# Patient Record
Sex: MALE | Race: WHITE
[De-identification: names, ages, dates, MRNs, and addresses within clinical notes are randomized per-mention and may not be internally consistent; named-entity substitution may affect disease eponyms.]

---

## 2019-12-06 ENCOUNTER — HOSPITAL ENCOUNTER (OUTPATIENT)
Dept: HOSPITAL 83 - RAD | Age: 64
Discharge: HOME | End: 2019-12-06
Attending: FAMILY MEDICINE
Payer: COMMERCIAL

## 2019-12-06 DIAGNOSIS — M51.36: Primary | ICD-10-CM

## 2019-12-06 DIAGNOSIS — M16.11: ICD-10-CM

## 2020-12-01 ENCOUNTER — HOSPITAL ENCOUNTER (OUTPATIENT)
Dept: HOSPITAL 83 - RAD | Age: 65
Discharge: HOME | End: 2020-12-01
Attending: FAMILY MEDICINE
Payer: COMMERCIAL

## 2020-12-01 DIAGNOSIS — M25.472: Primary | ICD-10-CM

## 2022-04-10 ENCOUNTER — HOSPITAL ENCOUNTER (EMERGENCY)
Dept: HOSPITAL 83 - ED | Age: 67
Discharge: HOME | End: 2022-04-10
Payer: COMMERCIAL

## 2022-04-10 VITALS — DIASTOLIC BLOOD PRESSURE: 65 MMHG

## 2022-04-10 VITALS — HEIGHT: 60 IN | WEIGHT: 212 LBS

## 2022-04-10 DIAGNOSIS — M25.431: Primary | ICD-10-CM

## 2022-10-25 ENCOUNTER — HOSPITAL ENCOUNTER (OUTPATIENT)
Dept: HOSPITAL 83 - LAB | Age: 67
Discharge: HOME | End: 2022-10-25
Attending: FAMILY MEDICINE
Payer: COMMERCIAL

## 2022-10-25 DIAGNOSIS — R79.89: ICD-10-CM

## 2022-10-25 DIAGNOSIS — R53.83: ICD-10-CM

## 2022-10-25 DIAGNOSIS — R74.8: ICD-10-CM

## 2022-10-25 DIAGNOSIS — E78.5: Primary | ICD-10-CM

## 2022-10-25 DIAGNOSIS — E55.9: ICD-10-CM

## 2022-10-25 DIAGNOSIS — Z12.5: ICD-10-CM

## 2022-10-25 LAB
25(OH)D3 SERPL-MCNC: 70.1 NG/ML (ref 30–100)
ALP SERPL-CCNC: 54 U/L (ref 45–117)
ALT SERPL W P-5'-P-CCNC: 38 U/L (ref 12–78)
AST SERPL-CCNC: 27 IU/L (ref 3–35)
BACTERIA #/AREA URNS HPF: (no result) /[HPF]
BASOPHILS # BLD AUTO: 0.1 10*3/UL (ref 0–0.1)
BASOPHILS NFR BLD AUTO: 1.2 % (ref 0–1)
BUN SERPL-MCNC: 5 MG/DL (ref 7–24)
CHLORIDE SERPL-SCNC: 105 MMOL/L (ref 98–107)
CHOLEST SERPL-MCNC: 209 MG/DL (ref ?–200)
CREAT SERPL-MCNC: 0.77 MG/DL (ref 0.7–1.3)
EOSINOPHIL # BLD AUTO: 0.1 10*3/UL (ref 0–0.4)
EOSINOPHIL # BLD AUTO: 1.2 % (ref 1–4)
EPI CELLS #/AREA URNS HPF: (no result) /[HPF]
ERYTHROCYTE [DISTWIDTH] IN BLOOD BY AUTOMATED COUNT: 12.4 % (ref 0–14.5)
FERRITIN SERPL-MCNC: 582.1 NG/ML (ref 22–322)
GGT SERPL-CCNC: 160 U/L (ref 15–85)
HCT VFR BLD AUTO: 43.1 % (ref 42–52)
IRON SERPL-MCNC: 142 UG/DL (ref 65–175)
LDLC SERPL DIRECT ASSAY-MCNC: 114 MG/DL (ref 9–159)
LYMPHOCYTES # BLD AUTO: 2.1 10*3/UL (ref 1.3–4.4)
LYMPHOCYTES NFR BLD AUTO: 40.7 % (ref 27–41)
MCH RBC QN AUTO: 34.7 PG (ref 27–31)
MCHC RBC AUTO-ENTMCNC: 36.4 G/DL (ref 33–37)
MCV RBC AUTO: 95.1 FL (ref 80–94)
MONOCYTES # BLD AUTO: 0.6 10*3/UL (ref 0.1–1)
MONOCYTES NFR BLD MANUAL: 10.9 % (ref 3–9)
NEUT #: 2.3 10*3/UL (ref 2.3–7.9)
NEUT %: 45.6 % (ref 47–73)
NRBC BLD QL AUTO: 0 % (ref 0–0)
PH UR STRIP: 7.5 [PH] (ref 4.5–8)
PLATELET # BLD AUTO: 252 10*3/UL (ref 130–400)
PMV BLD AUTO: 9.2 FL (ref 9.6–12.3)
POTASSIUM SERPL-SCNC: 4.4 MMOL/L (ref 3.5–5.1)
PROT SERPL-MCNC: 7.5 GM/DL (ref 6.4–8.2)
RBC # BLD AUTO: 4.53 10*6/UL (ref 4.5–5.9)
RETICS/RBC NFR AUTO: 2.24 % (ref 0.5–2.5)
SODIUM SERPL-SCNC: 137 MMOL/L (ref 136–145)
SP GR UR: <= 1.005 (ref 1–1.03)
TRIGL SERPL-MCNC: 143 MG/DL (ref ?–150)
TSH SERPL DL<=0.005 MIU/L-ACNC: 1.07 UIU/ML (ref 0.36–4.75)
UROBILINOGEN UR STRIP-MCNC: 1 E.U./DL (ref 0–1)
VITAMIN B12: 286 PG/ML (ref 247–911)
WBC #/AREA URNS HPF: (no result) WBC/HPF (ref 0–5)
WBC NRBC COR # BLD AUTO: 5.1 10*3/UL (ref 4.8–10.8)

## 2023-07-28 ENCOUNTER — HOSPITAL ENCOUNTER (OUTPATIENT)
Dept: HOSPITAL 83 - ORTHO | Age: 68
Discharge: HOME | End: 2023-07-28
Attending: ORTHOPAEDIC SURGERY
Payer: COMMERCIAL

## 2023-07-28 DIAGNOSIS — M16.0: Primary | ICD-10-CM

## 2023-09-11 ENCOUNTER — HOSPITAL ENCOUNTER (OUTPATIENT)
Dept: HOSPITAL 83 - ORTHO | Age: 68
Discharge: HOME | End: 2023-09-11
Attending: ORTHOPAEDIC SURGERY
Payer: COMMERCIAL

## 2023-09-11 DIAGNOSIS — Z96.641: ICD-10-CM

## 2023-09-11 DIAGNOSIS — Z47.1: Primary | ICD-10-CM

## 2023-10-09 ENCOUNTER — HOSPITAL ENCOUNTER (OUTPATIENT)
Dept: HOSPITAL 83 - ORTHO | Age: 68
Discharge: HOME | End: 2023-10-09
Attending: ORTHOPAEDIC SURGERY
Payer: COMMERCIAL

## 2023-10-09 DIAGNOSIS — Z47.1: Primary | ICD-10-CM

## 2023-10-11 ENCOUNTER — HOSPITAL ENCOUNTER (OUTPATIENT)
Dept: HOSPITAL 83 - LAB | Age: 68
Discharge: HOME | End: 2023-10-11
Attending: FAMILY MEDICINE
Payer: COMMERCIAL

## 2023-10-11 DIAGNOSIS — E55.9: ICD-10-CM

## 2023-10-11 DIAGNOSIS — E78.5: ICD-10-CM

## 2023-10-11 DIAGNOSIS — Z12.5: ICD-10-CM

## 2023-10-11 DIAGNOSIS — R79.89: ICD-10-CM

## 2023-10-11 DIAGNOSIS — J44.9: Primary | ICD-10-CM

## 2023-10-11 DIAGNOSIS — R53.83: ICD-10-CM

## 2023-10-11 LAB
25(OH)D3 SERPL-MCNC: 102.7 NG/ML (ref 30–100)
ALP SERPL-CCNC: 95 U/L (ref 46–116)
ALT SERPL W P-5'-P-CCNC: 10 U/L (ref 10–49)
BACTERIA #/AREA URNS HPF: (no result) /[HPF]
BASOPHILS # BLD AUTO: 0.1 10*3/UL (ref 0–0.1)
BASOPHILS NFR BLD AUTO: 0.6 % (ref 0–1)
BILIRUB UR QL STRIP: (no result)
BUN SERPL-MCNC: 6 MG/DL (ref 9–23)
CHLORIDE SERPL-SCNC: 104 MMOL/L (ref 98–107)
CHOLEST SERPL-MCNC: 188 MG/DL (ref ?–200)
EOSINOPHIL # BLD AUTO: 0.1 10*3/UL (ref 0–0.4)
EOSINOPHIL # BLD AUTO: 0.6 % (ref 1–4)
EPI CELLS #/AREA URNS HPF: (no result) /[HPF]
ERYTHROCYTE [DISTWIDTH] IN BLOOD BY AUTOMATED COUNT: 13.5 % (ref 0–14.5)
GGT SERPL-CCNC: 42 U/L (ref 0–73)
HCT VFR BLD AUTO: 48.1 % (ref 42–52)
LDLC SERPL DIRECT ASSAY-MCNC: 99 MG/DL (ref 9–159)
LEUKOCYTE ESTERASE UR QL STRIP: (no result)
LYMPHOCYTES # BLD AUTO: 1.8 10*3/UL (ref 1.3–4.4)
LYMPHOCYTES NFR BLD AUTO: 16.6 % (ref 27–41)
MCH RBC QN AUTO: 31.8 PG (ref 27–31)
MCHC RBC AUTO-ENTMCNC: 33.3 G/DL (ref 33–37)
MCV RBC AUTO: 95.6 FL (ref 80–94)
MONOCYTES # BLD AUTO: 0.8 10*3/UL (ref 0.1–1)
MONOCYTES NFR BLD MANUAL: 7.7 % (ref 3–9)
NEUT #: 7.9 10*3/UL (ref 2.3–7.9)
NEUT %: 74.3 % (ref 47–73)
NRBC BLD QL AUTO: 0 10*3/UL (ref 0–0)
PH UR STRIP: 6 [PH] (ref 4.5–8)
PLATELET # BLD AUTO: 256 10*3/UL (ref 130–400)
PMV BLD AUTO: 9.3 FL (ref 9.6–12.3)
POTASSIUM SERPL-SCNC: 3.8 MMOL/L (ref 3.4–5.1)
PROT SERPL-MCNC: 8.1 GM/DL (ref 6–8)
RBC # BLD AUTO: 5.03 10*6/UL (ref 4.5–5.9)
RBC #/AREA URNS HPF: (no result) RBC/HPF (ref 0–2)
RETICS/RBC NFR AUTO: 2.59 % (ref 0.5–2.5)
SP GR UR: 1.02 (ref 1–1.03)
T3 SERPL-MCNC: 22.6 % (ref 22.4–36.7)
T4 SERPL-MCNC: 8.1 UG/DL (ref 4.5–10.9)
TRIGL SERPL-MCNC: 133 MG/DL (ref ?–150)
URATE SERPL-MCNC: 7.6 MG/DL (ref 3.7–9.2)
UROBILINOGEN UR STRIP-MCNC: 1 E.U./DL (ref 0–1)
WBC #/AREA URNS HPF: (no result) WBC/HPF (ref 0–5)
WBC NRBC COR # BLD AUTO: 10.7 10*3/UL (ref 4.8–10.8)

## 2023-10-12 LAB — DSDNA AB SER-ACNC: <1 IU/ML (ref 0–9)

## 2023-10-13 ENCOUNTER — HOSPITAL ENCOUNTER (INPATIENT)
Age: 68
LOS: 2 days | Discharge: HOME | DRG: 299 | End: 2023-10-15
Attending: STUDENT IN AN ORGANIZED HEALTH CARE EDUCATION/TRAINING PROGRAM | Admitting: STUDENT IN AN ORGANIZED HEALTH CARE EDUCATION/TRAINING PROGRAM
Payer: MEDICARE

## 2023-10-13 ENCOUNTER — HOSPITAL ENCOUNTER (OUTPATIENT)
Dept: HOSPITAL 83 - CT | Age: 68
Discharge: HOME | End: 2023-10-13
Attending: FAMILY MEDICINE
Payer: COMMERCIAL

## 2023-10-13 ENCOUNTER — HOSPITAL ENCOUNTER (EMERGENCY)
Dept: HOSPITAL 83 - ED | Age: 68
Discharge: ANOTHER HOSPITAL | End: 2023-10-13
Payer: COMMERCIAL

## 2023-10-13 VITALS — WEIGHT: 205 LBS | HEIGHT: 73.98 IN | BODY MASS INDEX: 26.31 KG/M2

## 2023-10-13 VITALS — DIASTOLIC BLOOD PRESSURE: 92 MMHG | SYSTOLIC BLOOD PRESSURE: 153 MMHG

## 2023-10-13 DIAGNOSIS — R06.00: ICD-10-CM

## 2023-10-13 DIAGNOSIS — I26.99: ICD-10-CM

## 2023-10-13 DIAGNOSIS — Z98.890: ICD-10-CM

## 2023-10-13 DIAGNOSIS — R06.09: ICD-10-CM

## 2023-10-13 DIAGNOSIS — I51.7: ICD-10-CM

## 2023-10-13 DIAGNOSIS — I25.10: ICD-10-CM

## 2023-10-13 DIAGNOSIS — R09.02: ICD-10-CM

## 2023-10-13 DIAGNOSIS — E78.00: ICD-10-CM

## 2023-10-13 DIAGNOSIS — F17.220: ICD-10-CM

## 2023-10-13 DIAGNOSIS — K21.9: ICD-10-CM

## 2023-10-13 DIAGNOSIS — Z48.89: Primary | ICD-10-CM

## 2023-10-13 DIAGNOSIS — I26.99: Primary | ICD-10-CM

## 2023-10-13 DIAGNOSIS — Z96.653: ICD-10-CM

## 2023-10-13 PROBLEM — I26.09 ACUTE PULMONARY EMBOLISM WITH ACUTE COR PULMONALE, UNSPECIFIED PULMONARY EMBOLISM TYPE (HCC): Status: ACTIVE | Noted: 2023-10-13

## 2023-10-13 LAB
ALP SERPL-CCNC: 79 U/L (ref 46–116)
ALT SERPL W P-5'-P-CCNC: < 7 U/L (ref 10–49)
BASOPHILS # BLD AUTO: 0.1 10*3/UL (ref 0–0.1)
BASOPHILS NFR BLD AUTO: 0.8 % (ref 0–1)
BUN SERPL-MCNC: 7 MG/DL (ref 9–23)
CHLORIDE SERPL-SCNC: 107 MMOL/L (ref 98–107)
EOSINOPHIL # BLD AUTO: 0.1 10*3/UL (ref 0–0.4)
EOSINOPHIL # BLD AUTO: 0.9 % (ref 1–4)
ERYTHROCYTE [DISTWIDTH] IN BLOOD BY AUTOMATED COUNT: 13.5 % (ref 0–14.5)
ERYTHROCYTE [DISTWIDTH] IN BLOOD BY AUTOMATED COUNT: 13.7 % (ref 11.5–15)
HCT VFR BLD AUTO: 42.4 % (ref 37–54)
HCT VFR BLD AUTO: 44.2 % (ref 42–52)
HGB BLD-MCNC: 14.1 G/DL (ref 12.5–16.5)
INR BLD: 1.1 (ref 2–3.5)
LYMPHOCYTES # BLD AUTO: 1.8 10*3/UL (ref 1.3–4.4)
LYMPHOCYTES NFR BLD AUTO: 23.6 % (ref 27–41)
MCH RBC QN AUTO: 32 PG (ref 27–31)
MCH RBC QN AUTO: 32.3 PG (ref 26–35)
MCHC RBC AUTO-ENTMCNC: 33.3 G/DL (ref 32–34.5)
MCHC RBC AUTO-ENTMCNC: 33.3 G/DL (ref 33–37)
MCV RBC AUTO: 96.1 FL (ref 80–94)
MCV RBC AUTO: 97 FL (ref 80–99.9)
MONOCYTES # BLD AUTO: 0.7 10*3/UL (ref 0.1–1)
MONOCYTES NFR BLD MANUAL: 8.7 % (ref 3–9)
NEUT #: 5.1 10*3/UL (ref 2.3–7.9)
NEUT %: 65.7 % (ref 47–73)
NRBC BLD QL AUTO: 0 % (ref 0–0)
PARTIAL THROMBOPLASTIN TIME: 33.4 SEC (ref 24.5–35.1)
PLATELET # BLD AUTO: 205 K/UL (ref 130–450)
PLATELET # BLD AUTO: 212 10*3/UL (ref 130–400)
PMV BLD AUTO: 9.4 FL (ref 9.6–12.3)
PMV BLD AUTO: 9.8 FL (ref 7–12)
POTASSIUM SERPL-SCNC: 3.9 MMOL/L (ref 3.4–5.1)
PROT SERPL-MCNC: 7.1 GM/DL (ref 6–8)
RBC # BLD AUTO: 4.37 M/UL (ref 3.8–5.8)
RBC # BLD AUTO: 4.6 10*6/UL (ref 4.5–5.9)
WBC NRBC COR # BLD AUTO: 7.7 10*3/UL (ref 4.8–10.8)
WBC OTHER # BLD: 7.8 K/UL (ref 4.5–11.5)

## 2023-10-13 PROCEDURE — 85027 COMPLETE CBC AUTOMATED: CPT

## 2023-10-13 PROCEDURE — 36415 COLL VENOUS BLD VENIPUNCTURE: CPT

## 2023-10-13 PROCEDURE — 6360000002 HC RX W HCPCS: Performed by: FAMILY MEDICINE

## 2023-10-13 PROCEDURE — 2580000003 HC RX 258: Performed by: FAMILY MEDICINE

## 2023-10-13 PROCEDURE — 85730 THROMBOPLASTIN TIME PARTIAL: CPT

## 2023-10-13 PROCEDURE — 2140000000 HC CCU INTERMEDIATE R&B

## 2023-10-13 RX ORDER — PRAVASTATIN SODIUM 40 MG
40 TABLET ORAL DAILY
Status: ON HOLD | COMMUNITY
End: 2023-10-15 | Stop reason: SDUPTHER

## 2023-10-13 RX ORDER — ACETAMINOPHEN 500 MG
500 TABLET ORAL EVERY 6 HOURS PRN
Status: ON HOLD | COMMUNITY
End: 2023-10-15 | Stop reason: SDUPTHER

## 2023-10-13 RX ORDER — HEPARIN SODIUM 1000 [USP'U]/ML
80 INJECTION, SOLUTION INTRAVENOUS; SUBCUTANEOUS PRN
Status: DISCONTINUED | OUTPATIENT
Start: 2023-10-13 | End: 2023-10-15 | Stop reason: HOSPADM

## 2023-10-13 RX ORDER — POLYETHYLENE GLYCOL 3350 17 G/17G
17 POWDER, FOR SOLUTION ORAL DAILY PRN
Status: DISCONTINUED | OUTPATIENT
Start: 2023-10-13 | End: 2023-10-15 | Stop reason: HOSPADM

## 2023-10-13 RX ORDER — SODIUM CHLORIDE 0.9 % (FLUSH) 0.9 %
5-40 SYRINGE (ML) INJECTION PRN
Status: DISCONTINUED | OUTPATIENT
Start: 2023-10-13 | End: 2023-10-15 | Stop reason: HOSPADM

## 2023-10-13 RX ORDER — SODIUM CHLORIDE 0.9 % (FLUSH) 0.9 %
5-40 SYRINGE (ML) INJECTION EVERY 12 HOURS SCHEDULED
Status: DISCONTINUED | OUTPATIENT
Start: 2023-10-13 | End: 2023-10-15 | Stop reason: HOSPADM

## 2023-10-13 RX ORDER — ACETAMINOPHEN 325 MG/1
650 TABLET ORAL EVERY 6 HOURS PRN
Status: DISCONTINUED | OUTPATIENT
Start: 2023-10-13 | End: 2023-10-15 | Stop reason: HOSPADM

## 2023-10-13 RX ORDER — OMEGA-3S/DHA/EPA/FISH OIL/D3 300MG-1000
400 CAPSULE ORAL DAILY
Status: DISCONTINUED | OUTPATIENT
Start: 2023-10-14 | End: 2023-10-15 | Stop reason: HOSPADM

## 2023-10-13 RX ORDER — ONDANSETRON 4 MG/1
4 TABLET, ORALLY DISINTEGRATING ORAL EVERY 8 HOURS PRN
Status: DISCONTINUED | OUTPATIENT
Start: 2023-10-13 | End: 2023-10-15 | Stop reason: HOSPADM

## 2023-10-13 RX ORDER — SODIUM CHLORIDE 9 MG/ML
INJECTION, SOLUTION INTRAVENOUS PRN
Status: DISCONTINUED | OUTPATIENT
Start: 2023-10-13 | End: 2023-10-15 | Stop reason: HOSPADM

## 2023-10-13 RX ORDER — OMEPRAZOLE 20 MG/1
40 CAPSULE, DELAYED RELEASE ORAL DAILY
Status: ON HOLD | COMMUNITY
End: 2023-10-15 | Stop reason: SDUPTHER

## 2023-10-13 RX ORDER — OMEGA-3S/DHA/EPA/FISH OIL/D3 300MG-1000
400 CAPSULE ORAL DAILY
Status: ON HOLD | COMMUNITY
End: 2023-10-15 | Stop reason: SDUPTHER

## 2023-10-13 RX ORDER — HEPARIN SODIUM 10000 [USP'U]/100ML
5-30 INJECTION, SOLUTION INTRAVENOUS CONTINUOUS
Status: DISCONTINUED | OUTPATIENT
Start: 2023-10-13 | End: 2023-10-15 | Stop reason: HOSPADM

## 2023-10-13 RX ORDER — ACETAMINOPHEN 650 MG/1
650 SUPPOSITORY RECTAL EVERY 6 HOURS PRN
Status: DISCONTINUED | OUTPATIENT
Start: 2023-10-13 | End: 2023-10-15 | Stop reason: HOSPADM

## 2023-10-13 RX ORDER — ONDANSETRON 2 MG/ML
4 INJECTION INTRAMUSCULAR; INTRAVENOUS EVERY 6 HOURS PRN
Status: DISCONTINUED | OUTPATIENT
Start: 2023-10-13 | End: 2023-10-15 | Stop reason: HOSPADM

## 2023-10-13 RX ORDER — PRAVASTATIN SODIUM 20 MG
40 TABLET ORAL DAILY
Status: DISCONTINUED | OUTPATIENT
Start: 2023-10-14 | End: 2023-10-15 | Stop reason: HOSPADM

## 2023-10-13 RX ORDER — PANTOPRAZOLE SODIUM 40 MG/1
40 TABLET, DELAYED RELEASE ORAL
Status: DISCONTINUED | OUTPATIENT
Start: 2023-10-14 | End: 2023-10-15 | Stop reason: HOSPADM

## 2023-10-13 RX ORDER — HEPARIN SODIUM 1000 [USP'U]/ML
40 INJECTION, SOLUTION INTRAVENOUS; SUBCUTANEOUS PRN
Status: DISCONTINUED | OUTPATIENT
Start: 2023-10-13 | End: 2023-10-15 | Stop reason: HOSPADM

## 2023-10-13 RX ORDER — IBUPROFEN 800 MG/1
800 TABLET ORAL EVERY 6 HOURS PRN
Status: ON HOLD | COMMUNITY
End: 2023-10-15 | Stop reason: HOSPADM

## 2023-10-13 RX ORDER — HEPARIN SODIUM 1000 [USP'U]/ML
80 INJECTION, SOLUTION INTRAVENOUS; SUBCUTANEOUS ONCE
Status: COMPLETED | OUTPATIENT
Start: 2023-10-13 | End: 2023-10-13

## 2023-10-13 RX ADMIN — HEPARIN SODIUM 18 UNITS/KG/HR: 10000 INJECTION, SOLUTION INTRAVENOUS at 22:57

## 2023-10-13 RX ADMIN — HEPARIN SODIUM 7220 UNITS: 1000 INJECTION INTRAVENOUS; SUBCUTANEOUS at 22:55

## 2023-10-13 RX ADMIN — SODIUM CHLORIDE, PRESERVATIVE FREE 10 ML: 5 INJECTION INTRAVENOUS at 23:35

## 2023-10-14 ENCOUNTER — APPOINTMENT (OUTPATIENT)
Dept: ULTRASOUND IMAGING | Age: 68
DRG: 299 | End: 2023-10-14
Attending: STUDENT IN AN ORGANIZED HEALTH CARE EDUCATION/TRAINING PROGRAM
Payer: MEDICARE

## 2023-10-14 PROBLEM — I26.99 BILATERAL PULMONARY EMBOLISM (HCC): Status: ACTIVE | Noted: 2023-10-14

## 2023-10-14 LAB
ANION GAP SERPL CALCULATED.3IONS-SCNC: 11 MMOL/L (ref 7–16)
BASOPHILS # BLD: 0.09 K/UL (ref 0–0.2)
BASOPHILS NFR BLD: 1 % (ref 0–2)
BNP SERPL-MCNC: 357 PG/ML (ref 0–125)
BNP SERPL-MCNC: 600 PG/ML (ref 0–125)
BUN SERPL-MCNC: 7 MG/DL (ref 6–23)
CALCIUM SERPL-MCNC: 9.6 MG/DL (ref 8.6–10.2)
CHLORIDE SERPL-SCNC: 104 MMOL/L (ref 98–107)
CO2 SERPL-SCNC: 24 MMOL/L (ref 22–29)
CREAT SERPL-MCNC: 0.8 MG/DL (ref 0.7–1.2)
EOSINOPHIL # BLD: 0.12 K/UL (ref 0.05–0.5)
EOSINOPHILS RELATIVE PERCENT: 2 % (ref 0–6)
ERYTHROCYTE [DISTWIDTH] IN BLOOD BY AUTOMATED COUNT: 13.3 % (ref 11.5–15)
GFR SERPL CREATININE-BSD FRML MDRD: >60 ML/MIN/1.73M2
GLUCOSE SERPL-MCNC: 84 MG/DL (ref 74–99)
HCT VFR BLD AUTO: 40 % (ref 37–54)
HGB BLD-MCNC: 13.1 G/DL (ref 12.5–16.5)
IMM GRANULOCYTES # BLD AUTO: 0.03 K/UL (ref 0–0.58)
IMM GRANULOCYTES NFR BLD: 0 % (ref 0–5)
LYMPHOCYTES NFR BLD: 2.1 K/UL (ref 1.5–4)
LYMPHOCYTES RELATIVE PERCENT: 27 % (ref 20–42)
MAGNESIUM SERPL-MCNC: 1.6 MG/DL (ref 1.6–2.6)
MCH RBC QN AUTO: 31.7 PG (ref 26–35)
MCHC RBC AUTO-ENTMCNC: 32.8 G/DL (ref 32–34.5)
MCV RBC AUTO: 96.9 FL (ref 80–99.9)
MONOCYTES NFR BLD: 0.81 K/UL (ref 0.1–0.95)
MONOCYTES NFR BLD: 10 % (ref 2–12)
NEUTROPHILS NFR BLD: 60 % (ref 43–80)
NEUTS SEG NFR BLD: 4.69 K/UL (ref 1.8–7.3)
PARTIAL THROMBOPLASTIN TIME: 113.4 SEC (ref 24.5–35.1)
PARTIAL THROMBOPLASTIN TIME: 55.3 SEC (ref 24.5–35.1)
PARTIAL THROMBOPLASTIN TIME: 64.9 SEC (ref 24.5–35.1)
PLATELET # BLD AUTO: 188 K/UL (ref 130–450)
PMV BLD AUTO: 10.2 FL (ref 7–12)
POTASSIUM SERPL-SCNC: 3.4 MMOL/L (ref 3.5–5)
RBC # BLD AUTO: 4.13 M/UL (ref 3.8–5.8)
SODIUM SERPL-SCNC: 139 MMOL/L (ref 132–146)
TROPONIN I SERPL HS-MCNC: 25 NG/L (ref 0–11)
WBC OTHER # BLD: 7.8 K/UL (ref 4.5–11.5)

## 2023-10-14 PROCEDURE — 99223 1ST HOSP IP/OBS HIGH 75: CPT | Performed by: SURGERY

## 2023-10-14 PROCEDURE — 2140000000 HC CCU INTERMEDIATE R&B

## 2023-10-14 PROCEDURE — 36415 COLL VENOUS BLD VENIPUNCTURE: CPT

## 2023-10-14 PROCEDURE — 85730 THROMBOPLASTIN TIME PARTIAL: CPT

## 2023-10-14 PROCEDURE — 93306 TTE W/DOPPLER COMPLETE: CPT

## 2023-10-14 PROCEDURE — 83735 ASSAY OF MAGNESIUM: CPT

## 2023-10-14 PROCEDURE — 85025 COMPLETE CBC W/AUTO DIFF WBC: CPT

## 2023-10-14 PROCEDURE — 93970 EXTREMITY STUDY: CPT

## 2023-10-14 PROCEDURE — 6360000002 HC RX W HCPCS: Performed by: STUDENT IN AN ORGANIZED HEALTH CARE EDUCATION/TRAINING PROGRAM

## 2023-10-14 PROCEDURE — 6370000000 HC RX 637 (ALT 250 FOR IP): Performed by: STUDENT IN AN ORGANIZED HEALTH CARE EDUCATION/TRAINING PROGRAM

## 2023-10-14 PROCEDURE — 84484 ASSAY OF TROPONIN QUANT: CPT

## 2023-10-14 PROCEDURE — 2580000003 HC RX 258: Performed by: FAMILY MEDICINE

## 2023-10-14 PROCEDURE — 80048 BASIC METABOLIC PNL TOTAL CA: CPT

## 2023-10-14 PROCEDURE — 6370000000 HC RX 637 (ALT 250 FOR IP): Performed by: FAMILY MEDICINE

## 2023-10-14 PROCEDURE — 6370000000 HC RX 637 (ALT 250 FOR IP): Performed by: INTERNAL MEDICINE

## 2023-10-14 PROCEDURE — 6360000002 HC RX W HCPCS: Performed by: FAMILY MEDICINE

## 2023-10-14 PROCEDURE — 83880 ASSAY OF NATRIURETIC PEPTIDE: CPT

## 2023-10-14 RX ORDER — MAGNESIUM SULFATE IN WATER 40 MG/ML
4000 INJECTION, SOLUTION INTRAVENOUS ONCE
Status: COMPLETED | OUTPATIENT
Start: 2023-10-14 | End: 2023-10-14

## 2023-10-14 RX ORDER — GUAIFENESIN 400 MG/1
400 TABLET ORAL 4 TIMES DAILY PRN
Status: DISCONTINUED | OUTPATIENT
Start: 2023-10-14 | End: 2023-10-15 | Stop reason: HOSPADM

## 2023-10-14 RX ORDER — HYDROXYZINE PAMOATE 25 MG/1
25 CAPSULE ORAL EVERY 8 HOURS PRN
Status: DISCONTINUED | OUTPATIENT
Start: 2023-10-14 | End: 2023-10-15 | Stop reason: HOSPADM

## 2023-10-14 RX ADMIN — POTASSIUM BICARBONATE 20 MEQ: 782 TABLET, EFFERVESCENT ORAL at 09:07

## 2023-10-14 RX ADMIN — HEPARIN SODIUM 15 UNITS/KG/HR: 10000 INJECTION, SOLUTION INTRAVENOUS at 17:12

## 2023-10-14 RX ADMIN — MAGNESIUM SULFATE HEPTAHYDRATE 4000 MG: 40 INJECTION, SOLUTION INTRAVENOUS at 09:05

## 2023-10-14 RX ADMIN — PANTOPRAZOLE SODIUM 40 MG: 40 TABLET, DELAYED RELEASE ORAL at 07:27

## 2023-10-14 RX ADMIN — SODIUM CHLORIDE, PRESERVATIVE FREE 10 ML: 5 INJECTION INTRAVENOUS at 07:58

## 2023-10-14 RX ADMIN — POTASSIUM BICARBONATE 20 MEQ: 782 TABLET, EFFERVESCENT ORAL at 14:09

## 2023-10-14 RX ADMIN — PRAVASTATIN SODIUM 40 MG: 20 TABLET ORAL at 07:58

## 2023-10-14 RX ADMIN — CHOLECALCIFEROL TAB 10 MCG (400 UNIT) 400 UNITS: 10 TAB at 07:58

## 2023-10-14 RX ADMIN — HYDROXYZINE PAMOATE 25 MG: 25 CAPSULE ORAL at 22:57

## 2023-10-14 RX ADMIN — HEPARIN SODIUM 15 UNITS/KG/HR: 10000 INJECTION, SOLUTION INTRAVENOUS at 09:04

## 2023-10-14 RX ADMIN — POTASSIUM BICARBONATE 20 MEQ: 782 TABLET, EFFERVESCENT ORAL at 11:13

## 2023-10-14 NOTE — PROGRESS NOTES
Dr Glorious Bloch notified via perfect serve that pt has arrived to unit and needs admission orders.

## 2023-10-14 NOTE — FLOWSHEET NOTE
Pt arrived to unit with the following belongings:   10/13/23 2030   Belongings   Dental Appliances None   Vision - Corrective Lenses None   Hearing Aid None   Clothing Footwear;Pants; Shirt;Socks; Sweater; Undergarments   Jewelry Necklace;Ring;Watch   Electronic Devices Cell Phone;   Weapons (Notify Protective Services/Security) None   Other Valuables Money; Wallet  ($14)   Home Medications None   Valuables Given To Patient   Provide Name(s) of Who Valuable(s) Were Given To dona

## 2023-10-14 NOTE — H&P
Hospital Medicine History & Physical      PCP: Denver Kansas, MD    Date of Admission: 10/13/2023    Date of Service: Pt seen/examined on 10/13/2023 and Admitted to Inpatient with expected LOS greater than two midnights due to medical therapy. Chief Complaint:  pulmonary embolus      History Of Present Illness:    16-year-old past medical history of GERD and hyperlipidemia presented to [facility] due to shortness of breath. He was also hypoxic on presentation. Patient present from his PCP as is concern for pulm embolism with RV strain. Has been having worsening dyspnea on exertion. Of note patient had recent hip surgery 6 weeks ago. Denies any history of previous blood clots. ER physician at [facility] did speak to vascular surgery here who recommended transfer still see as consultation. Lab work with sodium of 140, potassium of 3.9, CO2 107, creatinine 1.02, troponin of 36 BNP of 161.8, WBC of 7.7 hemoglobin 14.7, platelets of 404,036. CTA chest showed extensive bilateral pulmonary involving all lobes including a large saddle embolus across the main pulmonary artery and large embolic burden with moderate RV strain. EKG with sinus rhythm abnormal R wave progression with possible old inferior infarct. Denies any history of blood clots. Mentioned that he has dyspnea mostly on exertion. Has been more noticeable since his right hip surgery. He is comfortable at rest.  Mention has been trying to do the exercises that was recommended after his surgery. Past Medical History:      History reviewed. No pertinent past medical history. Past Surgical History:      No past surgical history on file. Medications Prior to Admission:      Prior to Admission medications    Medication Sig Start Date End Date Taking?  Authorizing Provider   acetaminophen (TYLENOL) 500 MG tablet Take 1 tablet by mouth every 6 hours as needed for Pain   Yes Provider, MD Trudy   ibuprofen (ADVIL;MOTRIN)

## 2023-10-14 NOTE — PROGRESS NOTES
4 Eyes Skin Assessment     NAME:  Rex Appiah  YOB: 1955  MEDICAL RECORD NUMBER:  [id number]    The patient is being assessed for  Admission    I agree that at least one RN has performed a thorough Head to Toe Skin Assessment on the patient. ALL assessment sites listed below have been assessed. Areas assessed by both nurses:    Head, Face, Ears, Shoulders, Back, Chest, Arms, Elbows, Hands, Sacrum. Buttock, Coccyx, Ischium, Legs. Feet and Heels, and Under Medical Devices         Does the Patient have a Wound?  No noted wound(s)       Skinny Prevention initiated by RN: No  Wound Care Orders initiated by RN: No    Pressure Injury (Stage 3,4, Unstageable, DTI, NWPT, and Complex wounds) if present, place Wound referral order by RN under : No    New Ostomies, if present place, Ostomy referral order under : No     Nurse 1 eSignature: Electronically signed by Tamir Wood RN on 10/13/23 at 11:06 PM EDT    **SHARE this note so that the co-signing nurse can place an eSignature**    Nurse 2 eSignature: Electronically signed by Pepe Whitlock RN on 10/14/23 at 1:30 AM EDT

## 2023-10-14 NOTE — CONSULTS
breathing, on room air. CARDIOVASCULAR:  regular rate and rhythm  ABDOMEN:  soft, mildly distended, non-tender  SKIN:  normal skin color, texture, turgor    EXTREMITIES:    R UE Swelling absent   Incisions absent         5/5 Strength, Intact Sensation    L UE Swelling absent   Incisions absent         5/5 Strength, Intact Sensation    R LE Edema absent     Incisions absent    Varicose veins absent    Wounds absent    Normal Capillary Refill   5/5 Strength, Intact Sensation    L LE Edema absent     Incisions absent    Varicose veins absent    Wounds absent    Normal Capillary Refill   5/5 Strength, Intact Sensation    R radial 2+ L radial 2+   R posterior tibial 2+ L posterior tibial 2+       LABS:    Lab Results   Component Value Date    WBC 7.8 10/13/2023    HGB 14.1 10/13/2023    HCT 42.4 10/13/2023     10/13/2023    PROTIME 10.7 10/07/2016    INR 1.0 10/07/2016    K 4.1 10/20/2016    BUN 5 (L) 10/20/2016    CREATININE 0.8 10/20/2016       RADIOLOGY:  Imaging disks not sent from [facility]. Radiology report sent, reviewed (in patient's hard chart on 6500)      ASSESSMENT/PLAN:  71yo male with provoked PE's s/p R NGOZI 8/29    Continue Heparin gtt  Echo, LE DVT US pending  BNP at [facility] was 161, troponin was 36. Will repeat both here and ensure there is not a significant increase indicating worsening right heart strain, although clinically patient does not seem to be showing signs of right heart strain at this time as he is comfortable w/o acute complaints on room air, w saturations in the high 90s, no signs of IJV dilatation or peripheral edema. Discussed w Dr. Hoang Wilson, DO  Surgery Resident PGY-2  10/14/2023  3:22 AM    Pt seen and examined    He has minimal sob. He denies cp.       Patient is on RA and is hemodynamically stable    Provoked PE - recent R hip surgery  Us of bilateral LE ordered  Echo ordered  Pro bnp slightly elevated at 600      I did discuss with patient

## 2023-10-14 NOTE — PROGRESS NOTES
Dr. Melinda Irvin notified of the patient's US BLE results and the patient's wife wanting to speak with her about them. Dr. Jerrell Wu notified of the patient's ECHO results being completed.

## 2023-10-15 VITALS
WEIGHT: 198.8 LBS | HEIGHT: 73 IN | SYSTOLIC BLOOD PRESSURE: 145 MMHG | BODY MASS INDEX: 26.35 KG/M2 | RESPIRATION RATE: 16 BRPM | OXYGEN SATURATION: 95 % | HEART RATE: 69 BPM | TEMPERATURE: 96.8 F | DIASTOLIC BLOOD PRESSURE: 85 MMHG

## 2023-10-15 LAB
ANION GAP SERPL CALCULATED.3IONS-SCNC: 14 MMOL/L (ref 7–16)
BASOPHILS # BLD: 0.06 K/UL (ref 0–0.2)
BASOPHILS NFR BLD: 1 % (ref 0–2)
BUN SERPL-MCNC: 6 MG/DL (ref 6–23)
CALCIUM SERPL-MCNC: 9.1 MG/DL (ref 8.6–10.2)
CHLORIDE SERPL-SCNC: 103 MMOL/L (ref 98–107)
CO2 SERPL-SCNC: 20 MMOL/L (ref 22–29)
CREAT SERPL-MCNC: 0.7 MG/DL (ref 0.7–1.2)
EOSINOPHIL # BLD: 0.16 K/UL (ref 0.05–0.5)
EOSINOPHILS RELATIVE PERCENT: 2 % (ref 0–6)
ERYTHROCYTE [DISTWIDTH] IN BLOOD BY AUTOMATED COUNT: 13.2 % (ref 11.5–15)
GFR SERPL CREATININE-BSD FRML MDRD: >60 ML/MIN/1.73M2
GLUCOSE SERPL-MCNC: 90 MG/DL (ref 74–99)
HCT VFR BLD AUTO: 37.9 % (ref 37–54)
HGB BLD-MCNC: 12.6 G/DL (ref 12.5–16.5)
IMM GRANULOCYTES # BLD AUTO: 0.04 K/UL (ref 0–0.58)
IMM GRANULOCYTES NFR BLD: 1 % (ref 0–5)
LYMPHOCYTES NFR BLD: 1.91 K/UL (ref 1.5–4)
LYMPHOCYTES RELATIVE PERCENT: 28 % (ref 20–42)
MAGNESIUM SERPL-MCNC: 2 MG/DL (ref 1.6–2.6)
MCH RBC QN AUTO: 31.5 PG (ref 26–35)
MCHC RBC AUTO-ENTMCNC: 33.2 G/DL (ref 32–34.5)
MCV RBC AUTO: 94.8 FL (ref 80–99.9)
MONOCYTES NFR BLD: 0.7 K/UL (ref 0.1–0.95)
MONOCYTES NFR BLD: 10 % (ref 2–12)
NEUTROPHILS NFR BLD: 58 % (ref 43–80)
NEUTS SEG NFR BLD: 3.91 K/UL (ref 1.8–7.3)
PARTIAL THROMBOPLASTIN TIME: 73.8 SEC (ref 24.5–35.1)
PLATELET # BLD AUTO: 217 K/UL (ref 130–450)
PMV BLD AUTO: 10 FL (ref 7–12)
POTASSIUM SERPL-SCNC: 3.5 MMOL/L (ref 3.5–5)
RBC # BLD AUTO: 4 M/UL (ref 3.8–5.8)
SODIUM SERPL-SCNC: 137 MMOL/L (ref 132–146)
WBC OTHER # BLD: 6.8 K/UL (ref 4.5–11.5)

## 2023-10-15 PROCEDURE — 36415 COLL VENOUS BLD VENIPUNCTURE: CPT

## 2023-10-15 PROCEDURE — 83735 ASSAY OF MAGNESIUM: CPT

## 2023-10-15 PROCEDURE — 85025 COMPLETE CBC W/AUTO DIFF WBC: CPT

## 2023-10-15 PROCEDURE — 85730 THROMBOPLASTIN TIME PARTIAL: CPT

## 2023-10-15 PROCEDURE — 6370000000 HC RX 637 (ALT 250 FOR IP): Performed by: FAMILY MEDICINE

## 2023-10-15 PROCEDURE — 80048 BASIC METABOLIC PNL TOTAL CA: CPT

## 2023-10-15 RX ORDER — PRAVASTATIN SODIUM 40 MG
40 TABLET ORAL DAILY
Qty: 30 TABLET | Refills: 3 | Status: SHIPPED | OUTPATIENT
Start: 2023-10-15

## 2023-10-15 RX ORDER — OMEGA-3S/DHA/EPA/FISH OIL/D3 300MG-1000
400 CAPSULE ORAL DAILY
Qty: 30 TABLET | Refills: 0 | Status: SHIPPED | OUTPATIENT
Start: 2023-10-15

## 2023-10-15 RX ORDER — GUAIFENESIN 400 MG/1
400 TABLET ORAL 4 TIMES DAILY PRN
Qty: 56 TABLET | Refills: 0 | Status: SHIPPED | OUTPATIENT
Start: 2023-10-15 | End: 2023-10-15 | Stop reason: SDUPTHER

## 2023-10-15 RX ORDER — GUAIFENESIN 400 MG/1
400 TABLET ORAL 4 TIMES DAILY PRN
Qty: 56 TABLET | Refills: 0 | Status: SHIPPED | OUTPATIENT
Start: 2023-10-15

## 2023-10-15 RX ORDER — ACETAMINOPHEN 500 MG
500 TABLET ORAL EVERY 6 HOURS PRN
Qty: 120 TABLET | Refills: 3 | Status: SHIPPED | OUTPATIENT
Start: 2023-10-15

## 2023-10-15 RX ORDER — OMEPRAZOLE 20 MG/1
40 CAPSULE, DELAYED RELEASE ORAL DAILY
Qty: 30 CAPSULE | Refills: 3 | Status: SHIPPED | OUTPATIENT
Start: 2023-10-15

## 2023-10-15 RX ADMIN — ACETAMINOPHEN 650 MG: 325 TABLET ORAL at 08:27

## 2023-10-15 RX ADMIN — PRAVASTATIN SODIUM 40 MG: 20 TABLET ORAL at 08:22

## 2023-10-15 RX ADMIN — PANTOPRAZOLE SODIUM 40 MG: 40 TABLET, DELAYED RELEASE ORAL at 05:59

## 2023-10-15 RX ADMIN — CHOLECALCIFEROL TAB 10 MCG (400 UNIT) 400 UNITS: 10 TAB at 08:22

## 2023-10-15 ASSESSMENT — PAIN DESCRIPTION - PAIN TYPE: TYPE: CHRONIC PAIN

## 2023-10-15 ASSESSMENT — PAIN DESCRIPTION - DESCRIPTORS: DESCRIPTORS: ACHING;DISCOMFORT;SORE

## 2023-10-15 ASSESSMENT — PAIN - FUNCTIONAL ASSESSMENT: PAIN_FUNCTIONAL_ASSESSMENT: ACTIVITIES ARE NOT PREVENTED

## 2023-10-15 ASSESSMENT — PAIN DESCRIPTION - LOCATION: LOCATION: SHOULDER

## 2023-10-15 ASSESSMENT — PAIN SCALES - GENERAL: PAINLEVEL_OUTOF10: 4

## 2023-10-15 ASSESSMENT — PAIN DESCRIPTION - ORIENTATION: ORIENTATION: RIGHT

## 2023-10-15 NOTE — PROGRESS NOTES
Received message from Dr. Mariel Willett via [employer]: Vascular Surgery will see patient tomorrow, US was positive for DVT, patient currently on heparin therapy for treatment. I told patient and wife (on the phone) the update.       Dandre Salamanca RN

## 2023-10-15 NOTE — PROGRESS NOTES
Patient completed 6 min walk test, no complaints of SOB. SpO2 between 92-97 % on room air. Dr. Chriss Fitzgerald notified.

## 2023-10-15 NOTE — PROGRESS NOTES
[facility] Quality Flow/Interdisciplinary Rounds Progress Note        Quality Flow Rounds held on October 15, 2023    Disciplines Attending:  Bedside Nurse, , , and Nursing Unit Leadership    Gavino Driscoll was admitted on 10/13/2023  8:17 PM    Anticipated Discharge Date:       Disposition:    Skinny Score:  Skinny Scale Score: 20    Readmission Risk              Risk of Unplanned Readmission:  6           Discussed patient goal for the day, patient clinical progression, and barriers to discharge.   The following Goal(s) of the Day/Commitment(s) have been identified:  Diagnostics - Report Results and Labs - Report Results      Alyssia Kim RN  October 15, 2023

## 2023-10-15 NOTE — DISCHARGE SUMMARY
Jena Bowen MD  [place], [place]  +++++++++++++++++++++++++++++++++++++++++++++++++  NOTE: This report was transcribed using voice recognition software. Every effort was made to ensure accuracy; however, inadvertent computerized transcription errors may be present.

## 2023-10-15 NOTE — CARE COORDINATION
Social Work Discharge Planning:  Discharge order noted. SW spoke with nursing patient has no needs.   Electronically signed by RUPALI Jacobsen on 10/15/2023 at 10:08 AM

## 2023-10-15 NOTE — PROGRESS NOTES
Sent message to Dr. Brian Ro regarding family request for visit regarding US results.     Keli Pereira RN

## 2023-10-15 NOTE — PROGRESS NOTES
Discharge instructions given to the patient and his wife with their knowledge of them. IV removed, Heart monitor removed.

## 2023-10-15 NOTE — PROGRESS NOTES
Sent message to Dr. Josselin Tidwell regarding patient request for anti-anxiety medication.     Severiano Ramming, RN

## 2023-10-15 NOTE — PLAN OF CARE
Problem: Discharge Planning  Goal: Discharge to home or other facility with appropriate resources  10/15/2023 1135 by Martha Cazares RN  Outcome: Progressing     Problem: Pain  Goal: Verbalizes/displays adequate comfort level or baseline comfort level  10/15/2023 1135 by Martha Cazares RN  Outcome: Progressing     Problem: Safety - Adult  Goal: Free from fall injury  10/15/2023 1135 by Martha Cazares RN  Outcome: Progressing     Problem: ABCDS Injury Assessment  Goal: Absence of physical injury  10/15/2023 1135 by Martha Cazares RN  Outcome: Progressing

## 2023-10-17 ENCOUNTER — TELEPHONE (OUTPATIENT)
Dept: SURGERY | Age: 68
End: 2023-10-17

## 2023-10-17 NOTE — TELEPHONE ENCOUNTER
Pt called in to [facility] an appt with Lovena Shock, he was seen in the ED. On the provider finder, its showing Lovena Shock is a resident at [facility]. Is Dr. Kristen Wong at your office? Please, advise. Mariusz Carney can be reached at [phone], or we can speak with pt's wife Melisa Haddad at [phone]. Thank you.

## 2023-10-17 NOTE — TELEPHONE ENCOUNTER
Patient was recently hospitalized for PE. Dr. Flores Ferreira was rounding with Dr. Javier Voss. Per vascular \"no plans for acute vascular intervention\". MA informed patient he should follow up with PCP and Orthopedic. Patient verbalized understanding.   Electronically signed by Demetria Elaine MA on 10/17/23 at 3:53 PM EDT

## 2023-11-20 ENCOUNTER — HOSPITAL ENCOUNTER (OUTPATIENT)
Dept: HOSPITAL 83 - ORTHO | Age: 68
Discharge: HOME | End: 2023-11-20
Attending: ORTHOPAEDIC SURGERY
Payer: COMMERCIAL

## 2023-11-20 DIAGNOSIS — Z96.641: ICD-10-CM

## 2023-11-20 DIAGNOSIS — Z47.1: Primary | ICD-10-CM

## 2023-11-20 DIAGNOSIS — M16.11: ICD-10-CM

## 2023-11-20 DIAGNOSIS — M47.816: ICD-10-CM

## 2024-02-21 ENCOUNTER — HOSPITAL ENCOUNTER (OUTPATIENT)
Dept: HOSPITAL 83 - ORTHO | Age: 69
Discharge: HOME | End: 2024-02-21
Attending: ORTHOPAEDIC SURGERY
Payer: COMMERCIAL

## 2024-02-21 DIAGNOSIS — Z47.1: Primary | ICD-10-CM

## 2024-02-21 DIAGNOSIS — M16.11: ICD-10-CM

## 2024-02-21 DIAGNOSIS — M47.817: ICD-10-CM

## 2024-05-20 ENCOUNTER — HOSPITAL ENCOUNTER (OUTPATIENT)
Dept: HOSPITAL 83 - LAB | Age: 69
Discharge: HOME | End: 2024-05-20
Attending: FAMILY MEDICINE
Payer: COMMERCIAL

## 2024-05-20 DIAGNOSIS — E78.5: ICD-10-CM

## 2024-05-20 DIAGNOSIS — R53.83: ICD-10-CM

## 2024-05-20 DIAGNOSIS — E55.9: Primary | ICD-10-CM

## 2024-05-20 DIAGNOSIS — R79.89: ICD-10-CM

## 2024-05-20 LAB
25(OH)D3 SERPL-MCNC: 103.5 NG/ML (ref 30–100)
ALP SERPL-CCNC: 64 U/L (ref 46–116)
ALT SERPL W P-5'-P-CCNC: 11 U/L (ref 5–49)
BASOPHILS # BLD AUTO: 0.1 10*3/UL (ref 0–0.1)
BASOPHILS NFR BLD AUTO: 0.9 % (ref 0–1)
BUN SERPL-MCNC: < 5 MG/DL (ref 9–23)
CASTS URNS QL MICRO: (no result)
CHLORIDE SERPL-SCNC: 104 MMOL/L (ref 98–107)
CHOLEST SERPL-MCNC: 178 MG/DL (ref ?–200)
EOSINOPHIL # BLD AUTO: 0.1 10*3/UL (ref 0–0.4)
EOSINOPHIL # BLD AUTO: 1.2 % (ref 1–4)
EPI CELLS #/AREA URNS HPF: (no result) /[HPF]
ERYTHROCYTE [DISTWIDTH] IN BLOOD BY AUTOMATED COUNT: 14 % (ref 0–14.5)
GGT SERPL-CCNC: 59 U/L (ref 0–73)
HCT VFR BLD AUTO: 43.9 % (ref 42–52)
LDLC SERPL DIRECT ASSAY-MCNC: 89 MG/DL (ref 9–159)
LYMPHOCYTES # BLD AUTO: 2.4 10*3/UL (ref 1.3–4.4)
LYMPHOCYTES NFR BLD AUTO: 43.3 % (ref 27–41)
MCH RBC QN AUTO: 32.8 PG (ref 27–31)
MCHC RBC AUTO-ENTMCNC: 34.2 G/DL (ref 33–37)
MCV RBC AUTO: 95.9 FL (ref 80–94)
MONOCYTES # BLD AUTO: 0.6 10*3/UL (ref 0.1–1)
MONOCYTES NFR BLD MANUAL: 10.8 % (ref 3–9)
NEUT #: 2.5 10*3/UL (ref 2.3–7.9)
NEUT %: 43.6 % (ref 47–73)
NRBC BLD QL AUTO: 0 10*3/UL (ref 0–0)
PH UR STRIP: 7 [PH] (ref 4.5–8)
PLATELET # BLD AUTO: 248 10*3/UL (ref 130–400)
PMV BLD AUTO: 9.2 FL (ref 9.6–12.3)
POTASSIUM SERPL-SCNC: 4 MMOL/L (ref 3.4–5.1)
PROT SERPL-MCNC: 7.1 GM/DL (ref 6–8)
RBC # BLD AUTO: 4.58 10*6/UL (ref 4.5–5.9)
RBC #/AREA URNS HPF: (no result) RBC/HPF (ref 0–2)
RETICS/RBC NFR AUTO: 1.91 % (ref 0.5–2.5)
SP GR UR: 1.02 (ref 1–1.03)
T3 SERPL-MCNC: 34.8 % (ref 22.4–36.7)
T4 SERPL-MCNC: 5.6 UG/DL (ref 4.5–10.9)
TRIGL SERPL-MCNC: 76 MG/DL (ref ?–150)
UROBILINOGEN UR STRIP-MCNC: 1 E.U./DL (ref 0–1)
WBC #/AREA URNS HPF: (no result) WBC/HPF (ref 0–5)
WBC NRBC COR # BLD AUTO: 5.6 10*3/UL (ref 4.8–10.8)

## 2024-06-26 ENCOUNTER — HOSPITAL ENCOUNTER (OUTPATIENT)
Dept: HOSPITAL 83 - LAB | Age: 69
Discharge: HOME | End: 2024-06-26
Attending: UROLOGY
Payer: COMMERCIAL

## 2024-06-26 DIAGNOSIS — R97.20: Primary | ICD-10-CM

## 2024-08-19 ENCOUNTER — HOSPITAL ENCOUNTER (OUTPATIENT)
Dept: HOSPITAL 83 - LAB | Age: 69
Discharge: HOME | End: 2024-08-19
Attending: UROLOGY
Payer: COMMERCIAL

## 2024-08-19 DIAGNOSIS — R97.20: Primary | ICD-10-CM

## 2024-08-21 ENCOUNTER — HOSPITAL ENCOUNTER (OUTPATIENT)
Dept: HOSPITAL 83 - ORTHO | Age: 69
Discharge: HOME | End: 2024-08-21
Attending: ORTHOPAEDIC SURGERY
Payer: COMMERCIAL

## 2024-08-21 DIAGNOSIS — Z47.1: Primary | ICD-10-CM

## 2024-08-21 DIAGNOSIS — Z96.641: ICD-10-CM

## 2024-11-14 ENCOUNTER — HOSPITAL ENCOUNTER (OUTPATIENT)
Dept: HOSPITAL 83 - LAB | Age: 69
Discharge: HOME | End: 2024-11-14
Attending: UROLOGY
Payer: COMMERCIAL

## 2024-11-14 DIAGNOSIS — R97.20: Primary | ICD-10-CM

## 2025-05-08 ENCOUNTER — HOSPITAL ENCOUNTER (OUTPATIENT)
Dept: HOSPITAL 83 - LAB | Age: 70
Discharge: HOME | End: 2025-05-08
Attending: UROLOGY
Payer: COMMERCIAL

## 2025-05-08 DIAGNOSIS — E78.5: ICD-10-CM

## 2025-05-08 DIAGNOSIS — R53.83: ICD-10-CM

## 2025-05-08 DIAGNOSIS — R79.89: ICD-10-CM

## 2025-05-08 DIAGNOSIS — E55.9: ICD-10-CM

## 2025-05-08 DIAGNOSIS — R97.20: Primary | ICD-10-CM

## 2025-05-08 LAB
ALP SERPL-CCNC: 46 U/L (ref 46–116)
ALT SERPL W P-5'-P-CCNC: 15 U/L (ref 5–49)
BACTERIA #/AREA URNS HPF: (no result) /[HPF]
BASOPHILS # BLD AUTO: 0.1 10*3/UL (ref 0–0.1)
BASOPHILS NFR BLD AUTO: 0.9 % (ref 0–1)
BUN SERPL-MCNC: < 5 MG/DL (ref 9–23)
CHLORIDE SERPL-SCNC: 102 MMOL/L (ref 98–107)
CHOLEST SERPL-MCNC: 182 MG/DL (ref ?–200)
EOSINOPHIL # BLD AUTO: 0.6 % (ref 1–4)
EPI CELLS #/AREA URNS HPF: (no result) /[HPF]
ERYTHROCYTE [DISTWIDTH] IN BLOOD BY AUTOMATED COUNT: 12.8 % (ref 0–14.5)
GGT SERPL-CCNC: 47 U/L (ref 0–73)
HCT VFR BLD AUTO: 44.6 % (ref 42–52)
LDLC SERPL DIRECT ASSAY-MCNC: 101 MG/DL (ref 9–159)
MCH RBC QN AUTO: 32.3 PG (ref 27–31)
MCHC RBC AUTO-ENTMCNC: 33.9 G/DL (ref 33–37)
MCV RBC AUTO: 95.3 FL (ref 80–94)
MONOCYTES # BLD AUTO: 0.6 10*3/UL (ref 0.1–1)
MONOCYTES NFR BLD MANUAL: 11.1 % (ref 3–9)
MUCOUS THREADS URNS QL MICRO: (no result)
NEUT #: 2.6 10*3/UL (ref 2.3–7.9)
NEUTROPHILS NFR BLD AUTO: 47.3 % (ref 47–73)
PLATELET # BLD AUTO: 249 10*3/UL (ref 130–400)
PMV BLD AUTO: 9.1 FL (ref 9.6–12.3)
POTASSIUM SERPL-SCNC: 4.1 MMOL/L (ref 3.4–5.1)
PROT SERPL-MCNC: 7.1 GM/DL (ref 6–8)
RBC # BLD AUTO: 4.68 10*6/UL (ref 4.5–5.9)
RBC #/AREA URNS HPF: (no result) RBC/HPF (ref 0–2)
RETICS/RBC NFR AUTO: 1.33 % (ref 0.5–2.5)
T3 SERPL-MCNC: 39.9 % (ref 22.4–36.7)
T4 SERPL-MCNC: 5.7 UG/DL (ref 4.5–10.9)
TRIGL SERPL-MCNC: 73 MG/DL (ref ?–150)
UROBILINOGEN UR STRIP-MCNC: 0.2 E.U./DL (ref 0–1)
WBC NRBC COR # BLD AUTO: 5.4 10*3/UL (ref 4.8–10.8)